# Patient Record
Sex: FEMALE | Race: WHITE | NOT HISPANIC OR LATINO | Employment: STUDENT | URBAN - METROPOLITAN AREA
[De-identification: names, ages, dates, MRNs, and addresses within clinical notes are randomized per-mention and may not be internally consistent; named-entity substitution may affect disease eponyms.]

---

## 2024-05-08 ENCOUNTER — OFFICE VISIT (OUTPATIENT)
Dept: PRIMARY CARE | Facility: CLINIC | Age: 19
End: 2024-05-08
Payer: COMMERCIAL

## 2024-05-08 VITALS
BODY MASS INDEX: 19.63 KG/M2 | SYSTOLIC BLOOD PRESSURE: 96 MMHG | HEART RATE: 73 BPM | HEIGHT: 64 IN | OXYGEN SATURATION: 99 % | DIASTOLIC BLOOD PRESSURE: 62 MMHG | WEIGHT: 115 LBS | RESPIRATION RATE: 18 BRPM

## 2024-05-08 DIAGNOSIS — R63.4 WEIGHT LOSS: Primary | ICD-10-CM

## 2024-05-08 PROCEDURE — 99204 OFFICE O/P NEW MOD 45 MIN: CPT | Performed by: INTERNAL MEDICINE

## 2024-05-08 PROCEDURE — 3008F BODY MASS INDEX DOCD: CPT | Performed by: INTERNAL MEDICINE

## 2024-05-08 ASSESSMENT — PAIN SCALES - GENERAL: PAINLEVEL: 0-NO PAIN

## 2024-05-09 ENCOUNTER — LAB (OUTPATIENT)
Dept: LAB | Facility: LAB | Age: 19
End: 2024-05-09
Payer: COMMERCIAL

## 2024-05-09 DIAGNOSIS — R63.4 WEIGHT LOSS: ICD-10-CM

## 2024-05-09 LAB
25(OH)D3 SERPL-MCNC: 40 NG/ML (ref 30–100)
ALBUMIN SERPL BCP-MCNC: 4.6 G/DL (ref 3.4–5)
ALP SERPL-CCNC: 38 U/L (ref 33–110)
ALT SERPL W P-5'-P-CCNC: 11 U/L (ref 7–45)
ANION GAP SERPL CALC-SCNC: 11 MMOL/L (ref 10–20)
AST SERPL W P-5'-P-CCNC: 15 U/L (ref 9–39)
BILIRUB SERPL-MCNC: 0.6 MG/DL (ref 0–1.2)
BUN SERPL-MCNC: 18 MG/DL (ref 6–23)
CALCIUM SERPL-MCNC: 9.1 MG/DL (ref 8.6–10.3)
CHLORIDE SERPL-SCNC: 107 MMOL/L (ref 98–107)
CHOLEST SERPL-MCNC: 130 MG/DL (ref 0–199)
CHOLESTEROL/HDL RATIO: 2.6
CO2 SERPL-SCNC: 25 MMOL/L (ref 21–32)
CREAT SERPL-MCNC: 0.72 MG/DL (ref 0.5–1.05)
EGFRCR SERPLBLD CKD-EPI 2021: >90 ML/MIN/1.73M*2
ERYTHROCYTE [DISTWIDTH] IN BLOOD BY AUTOMATED COUNT: 13.2 % (ref 11.5–14.5)
EST. AVERAGE GLUCOSE BLD GHB EST-MCNC: 97 MG/DL
GLUCOSE SERPL-MCNC: 80 MG/DL (ref 74–99)
HBA1C MFR BLD: 5 %
HCT VFR BLD AUTO: 38.4 % (ref 36–46)
HDLC SERPL-MCNC: 50.1 MG/DL
HGB BLD-MCNC: 12.3 G/DL (ref 12–16)
HIV 1+2 AB+HIV1 P24 AG SERPL QL IA: NONREACTIVE
LDLC SERPL CALC-MCNC: 64 MG/DL
MCH RBC QN AUTO: 27.8 PG (ref 26–34)
MCHC RBC AUTO-ENTMCNC: 32 G/DL (ref 32–36)
MCV RBC AUTO: 87 FL (ref 80–100)
NON HDL CHOLESTEROL: 80 MG/DL (ref 0–119)
NRBC BLD-RTO: 0 /100 WBCS (ref 0–0)
PLATELET # BLD AUTO: 222 X10*3/UL (ref 150–450)
POTASSIUM SERPL-SCNC: 3.9 MMOL/L (ref 3.5–5.3)
PROT SERPL-MCNC: 6.9 G/DL (ref 6.4–8.2)
RBC # BLD AUTO: 4.43 X10*6/UL (ref 4–5.2)
SODIUM SERPL-SCNC: 139 MMOL/L (ref 136–145)
T3FREE SERPL-MCNC: 3.1 PG/ML (ref 3–4.7)
T4 FREE SERPL-MCNC: 0.77 NG/DL (ref 0.61–1.12)
TRIGL SERPL-MCNC: 79 MG/DL (ref 0–149)
TSH SERPL-ACNC: 1.65 MIU/L (ref 0.44–3.98)
VLDL: 16 MG/DL (ref 0–40)
WBC # BLD AUTO: 3 X10*3/UL (ref 4.4–11.3)

## 2024-05-09 PROCEDURE — 84443 ASSAY THYROID STIM HORMONE: CPT

## 2024-05-09 PROCEDURE — 80053 COMPREHEN METABOLIC PANEL: CPT

## 2024-05-09 PROCEDURE — 85027 COMPLETE CBC AUTOMATED: CPT

## 2024-05-09 PROCEDURE — 36415 COLL VENOUS BLD VENIPUNCTURE: CPT

## 2024-05-09 PROCEDURE — 83036 HEMOGLOBIN GLYCOSYLATED A1C: CPT

## 2024-05-09 PROCEDURE — 84481 FREE ASSAY (FT-3): CPT

## 2024-05-09 PROCEDURE — 82306 VITAMIN D 25 HYDROXY: CPT

## 2024-05-09 PROCEDURE — 84439 ASSAY OF FREE THYROXINE: CPT

## 2024-05-09 PROCEDURE — 80061 LIPID PANEL: CPT

## 2024-05-09 PROCEDURE — 87389 HIV-1 AG W/HIV-1&-2 AB AG IA: CPT

## 2024-05-10 PROBLEM — R63.4 WEIGHT LOSS: Status: ACTIVE | Noted: 2024-05-10

## 2024-05-11 NOTE — PROGRESS NOTES
"Kendal Lane is a 18 y.o. female who presents for New Patient Visit.  Patient presents to [facility].  Patient complains of unintentional weight loss.  She has lost  She is home for the summer from college.  She states that she  is eating more at home than she was at college. Despite this, she is losing weight.  She has occasional alcohol.  She is sexually active.  She denies any symptoms of STI.  She denies the possibility being pregnant.  She has a family history of thyroid disease in her mother.  She is uncertain what the diagnosis was but states she did require surgery.          Objective     BP 96/62 (BP Location: Right arm, Patient Position: Sitting, BP Cuff Size: Adult)   Pulse 73   Resp 18   Ht 1.626 m (5' 4\")   Wt 52.2 kg (115 lb)   LMP 05/08/2024   SpO2 99%   BMI 19.74 kg/m²      Physical Exam  Constitutional:       Appearance: Normal appearance.   HENT:      Head: Normocephalic and atraumatic.      Nose: Nose normal.      Mouth/Throat:      Mouth: Mucous membranes are moist.      Pharynx: Oropharynx is clear.   Eyes:      Extraocular Movements: Extraocular movements intact.      Pupils: Pupils are equal, round, and reactive to light.   Cardiovascular:      Rate and Rhythm: Normal rate and regular rhythm.   Pulmonary:      Effort: No respiratory distress.      Breath sounds: Normal breath sounds. No wheezing, rhonchi or rales.   Abdominal:      General: Bowel sounds are normal. There is no distension.      Palpations: Abdomen is soft.      Tenderness: There is no abdominal tenderness. There is no guarding.   Musculoskeletal:      Right lower leg: No edema.      Left lower leg: No edema.   Skin:     General: Skin is warm and dry.   Neurological:      Mental Status: She is alert and oriented to person, place, and time. Mental status is at baseline.   Psychiatric:         Mood and Affect: Mood normal.         Behavior: Behavior normal.         Assessment/Plan   Problem List Items " Addressed This Visit       Weight loss - Primary    Relevant Orders    CBC (Completed)    Comprehensive Metabolic Panel (Completed)    Hemoglobin A1C (Completed)    Lipid Panel (Completed)    Thyroid Stimulating Hormone (Completed)    Vitamin D 25-Hydroxy,Total (for eval of Vitamin D levels) (Completed)    Thyroxine, Free (Completed)    T3, free (Completed)    HIV 1/2 Antigen/Antibody Screen with Reflex to Confirmation (Completed)    US thyroid            Yordan Jarrett, DO

## 2024-07-09 ENCOUNTER — EVALUATION (OUTPATIENT)
Dept: PHYSICAL THERAPY | Facility: CLINIC | Age: 19
End: 2024-07-09
Payer: COMMERCIAL

## 2024-07-09 DIAGNOSIS — M25.562 ACUTE PAIN OF LEFT KNEE: Primary | ICD-10-CM

## 2024-07-09 PROCEDURE — 97110 THERAPEUTIC EXERCISES: CPT | Mod: GP | Performed by: SPECIALIST/TECHNOLOGIST

## 2024-07-09 PROCEDURE — 97161 PT EVAL LOW COMPLEX 20 MIN: CPT | Mod: GP | Performed by: SPECIALIST/TECHNOLOGIST

## 2024-07-09 NOTE — PROGRESS NOTES
Physical Therapy  Physical Therapy Orthopedic Evaluation    Patient Name: Elvia Lane  MRN: [id number]  Today's Date: 7/9/2024  Time Calculation  Start Time: 0930  Stop Time: 1030  Time Calculation (min): 60 min    Current Problem:  1. Acute pain of left knee            Reason For Visit: Initial Evaluation  Referred by: Self  - CC: L knee pain  - MONTY: pickleball  - DOI: x2 weeks  - PAIN - Location: posterior knee   Worst(past 24 hours): 7   Least(past 24 hours):   0  - PAIN - Alleviating: rest  Aggravating: pickle ball, stairs   - CURRENT MEDICAL MANAGEMENT: L ACLR w/ BPTB 2022  - PLOF: college student, active  - FUNCTIONAL LIMITATIONS: walking running twisting lifting carrying pivoting  - WORK: /college student   - EXERCISE: pickleball, gym     Pain:    7/10  Location: Posterior knee   Description: tightness       Insurance   Payer: [employer]  Visit Number: 1  Approved Visits: 10  Date Range: PCY  Misc: after 10 med review required     Precautions   L knee ACLR TB 2022     Outcome Measures:  Other Measures  Lower Extremity Funtional Score (LEFS): 73     Objective:  Objective   - LOWER EXTREMITY FUNCTIONAL SCALE: 73    - SENSATION: Intact in BLEs.    - POSTURE: WNL  - SWELLING: +  - PALPATION: Anterior Lateral knee, distal B HS      FLEXIBILITY  Teresa:  -   Hamstrings: mild limitation  Quads: -  Gastroc: -    Suzy, Lachmans, and Varus/Valgus: Negative  Apprehension with Med/Lat gliding: -      -MYOTOMES (MMT in sitting):  Hip Flex R/L: 5/5  Hip Abd R/L: 4/4  Hip Ext R/L: 5/5  Knee Ext  R/L:  5/5  Knee Flex R/L: 5/5  Ankle DF (L4) R/L: 5/5    P! W/ long lever bridge, tightness w/ 90/90 kick up and SL RDL    -ROM: (supine)  Hip Flexion R/L: WNL  Hip Extension R/L: WNL  Hip Abd R/L: WNL   ER R/L: WNL   IR R/L: WNL  Knee R and L: Full knee ext bilaterally without pain at end range with Overpressure  Patellar mobility: WNL      -BALANCE:   SLS R: seconds L:    SL Squat: normal depth  Rev Lunge: WNL  SL RDL:  "minor stretch    SL Broad Jump:  WNL  SL Triple Jump: decreased LLE distance noted w/ RFD 88% LSI    Treatments:  There Ex:   90 90 HS Kick Up x30   SL RDL x20   SL HS Slider x20   Knee Ext @ 110 BPM 3x10\"    Manual:     Neuro Re-Ed:     Modalities:     Assessment:     Patient presents with signs and symptoms consistent with L knee p! D/t distal HS strain, resulting in limited participation in pain-free ADLs and inability to perform at their prior level of function. Pt would benefit from physical therapy to address the impairments found & listed previously in the objective section in order to return to safe and pain-free ADLs and prior level of function.    Plan:   Increase HS length, load @ velocity when warranted  Frequency: Every Other Week  Duration: 8 Weeks    EDUCATION: Home exercise program, plan of care, activity modifications, pain management, and injury pathology       Goals:  Patient will be independent with HEP within 2 weeks  Patient will demonstrate pain-free single-leg bridge within 6 weeks to demonstrate decreased irritability of hamstring musculature patient will demonstrate normalized triple hop within 6 weeks to demonstrate normalized rate of force development  Patient will increase LEFS to 80 out of 80 to demonstrate significant progress with therapy.      "

## 2024-07-29 ENCOUNTER — TREATMENT (OUTPATIENT)
Dept: PHYSICAL THERAPY | Facility: CLINIC | Age: 19
End: 2024-07-29
Payer: COMMERCIAL

## 2024-07-29 DIAGNOSIS — M25.562 ACUTE PAIN OF LEFT KNEE: Primary | ICD-10-CM

## 2024-07-29 PROCEDURE — 97110 THERAPEUTIC EXERCISES: CPT | Mod: GP | Performed by: SPECIALIST/TECHNOLOGIST

## 2024-07-29 PROCEDURE — 97140 MANUAL THERAPY 1/> REGIONS: CPT | Mod: GP | Performed by: SPECIALIST/TECHNOLOGIST

## 2024-07-29 NOTE — PROGRESS NOTES
Physical Therapy Treatment    Patient Name: Elvia Lane  MRN: [id number]  Today's Date: 7/29/2024  Time Calculation  Start Time: 0730  Stop Time: 0830  Time Calculation (min): 60 min    Current Problem  1. Acute pain of left knee            Insurance   Payer: [employer]  Visit Number: 2  Approved Visits: 10  Date Range: PCY  Misc: after 10 med review required     Precautions         Subjective   Pt reports feeling good. Denies pain. Compliance w/ HEP.     Pain   0/10      Objective   No TTP  Normalized 90/90 kick up      Treatments:  There Ex:   Bike x6 min  90/90 HS 2x20  Banded Mvmt Prep x2 laps    Dynamic     A1. DL Broad to Vertical 3x5  B1. SL Triple Broad to DL Vertical 3x5  C1. Banded Overspeed Broad 3x3  D1. [place] 3x8  D2. SL RDL 3x8    Manual:     Ext + OP    Neuro Re-Ed:     Modalities:     Assessment:     Session focused on restoration rate of force development as well as hamstring length.  Patient responded very well.  No pain.  At this time patient feels to return to 100%.  Will return to prior level of function and continue to work on rate of force vomit outside of therapy.  Verbalizes plan understanding and agreement with plan of care at this time.  At this time we will monitor HEP and cancel following visits unless patient has flareup    Plan:   Monitor HEP    Goals:         Harshad Shields, PT

## 2024-08-19 ENCOUNTER — APPOINTMENT (OUTPATIENT)
Dept: PHYSICAL THERAPY | Facility: CLINIC | Age: 19
End: 2024-08-19
Payer: COMMERCIAL

## 2024-10-01 ENCOUNTER — OFFICE VISIT (OUTPATIENT)
Dept: PRIMARY CARE | Facility: CLINIC | Age: 19
End: 2024-10-01
Payer: COMMERCIAL

## 2024-10-01 VITALS
HEIGHT: 64 IN | SYSTOLIC BLOOD PRESSURE: 102 MMHG | BODY MASS INDEX: 18.61 KG/M2 | DIASTOLIC BLOOD PRESSURE: 58 MMHG | HEART RATE: 73 BPM | OXYGEN SATURATION: 99 % | WEIGHT: 109 LBS

## 2024-10-01 DIAGNOSIS — G47.00 ACUTE INSOMNIA: ICD-10-CM

## 2024-10-01 DIAGNOSIS — F32.A DEPRESSION, UNSPECIFIED DEPRESSION TYPE: Primary | ICD-10-CM

## 2024-10-01 DIAGNOSIS — R63.4 WEIGHT LOSS, NON-INTENTIONAL: ICD-10-CM

## 2024-10-01 PROCEDURE — 3008F BODY MASS INDEX DOCD: CPT | Performed by: NURSE PRACTITIONER

## 2024-10-01 PROCEDURE — 1036F TOBACCO NON-USER: CPT | Performed by: NURSE PRACTITIONER

## 2024-10-01 PROCEDURE — 99204 OFFICE O/P NEW MOD 45 MIN: CPT | Performed by: NURSE PRACTITIONER

## 2024-10-01 RX ORDER — CALCIUM CARBONATE/VITAMIN D3 500-10/5ML
400 LIQUID (ML) ORAL NIGHTLY
Qty: 30 CAPSULE | Refills: 2 | Status: SHIPPED | OUTPATIENT
Start: 2024-10-01 | End: 2024-12-30

## 2024-10-01 RX ORDER — ESCITALOPRAM OXALATE 5 MG/1
5 TABLET ORAL DAILY
Qty: 30 TABLET | Refills: 2 | Status: SHIPPED | OUTPATIENT
Start: 2024-10-01 | End: 2024-12-30

## 2024-10-01 ASSESSMENT — ENCOUNTER SYMPTOMS
AGITATION: 0
ALLERGIC/IMMUNOLOGIC NEGATIVE: 1
HALLUCINATIONS: 0
MUSCULOSKELETAL NEGATIVE: 1
DYSPHORIC MOOD: 0
DECREASED CONCENTRATION: 0
RESPIRATORY NEGATIVE: 1
LIGHT-HEADEDNESS: 0
CONSTITUTIONAL NEGATIVE: 1
GASTROINTESTINAL NEGATIVE: 1
WEAKNESS: 0
EYES NEGATIVE: 1
CARDIOVASCULAR NEGATIVE: 1
ENDOCRINE NEGATIVE: 1
FACIAL ASYMMETRY: 0
WOUND: 0
POLYPHAGIA: 0
SLEEP DISTURBANCE: 1
CONFUSION: 0
HEMATOLOGIC/LYMPHATIC NEGATIVE: 1
HYPERACTIVE: 0
NEUROLOGICAL NEGATIVE: 1
DEPRESSION: 0
DIZZINESS: 0

## 2024-10-01 NOTE — PATIENT INSTRUCTIONS
2 week FU for medication assessment   Please have therapist fax notes to office    Take magnesium OXIDE 400 mg at bedtime   Try tarte cherry juice at bedtime to help with sleep   Try to eat  4-6 small meals daily   Follow up with nutritional services: [phone]

## 2024-10-01 NOTE — PROGRESS NOTES
Subjective   Patient ID: Elvia Lane is a 18 y.o. female who presents for [facility].    HPI   Elvia is here to [facility] care of note she follows with a therapist in the outpatient community.  And has asked her to be started on Lexapro.  By this provider  She does have increasing depression since starting college over the summer.  Has noted with increased weight loss.  Denies any trouble with eating food.  And sleeping at night.  No thoughts of suicidal ideations.  Would like to refer to nutritional services for further insight.  Does complete a daily journal.    No problems with bowel or bladder.  No headaches or blurred vision.  Up-to-date with dental and eye exams.  No headaches or chest pain.  Discussed verbal remedies for sleeping such as magnesium dietary juice to help with sleep.  Does not take excessive amount of caffeine.  Increased and oral water.    Will try to take in protein rich snacks 4-5 times a day.  Previous labs were completed this year by former PCP reviewed with no issues noted.  Including vitamin D level.  Denies any need for STD testing.  Has a GYN.  Is not on birth control.       PAST MEDICAL HISTORY  07/17/2020: THELMA (acute kidney injury) (HCC)      Comment:  ATN 2/2 ibuprofen use following clavicular fx  No date: Wears glassesPAST SURGICAL HISTORY  No date: ORTHOPEDIC SURGERY HX      Comment:  collar bone with pin, summer 2020    FAMILY HISTORY    Problem Relation Age of Onset    Lipids Father      Heart Paternal Grandmother      Alcohol/Drug Maternal Grandfather           recovering alcoholic    other (lung cancer) Maternal Grandfather      Thyroid Mother           hyper-thyroidectomy    Asthma Mother      other (lung cancer) Other           great maternal grandfather   SOCIAL HISTORY  Social History           Tobacco Use    Smoking status: Never    Smokeless tobacco: Never   Vaping Use    Vaping Use: Never used   Substance Use Topics    Alcohol use: Never    Drug use: Never     Review of  "Systems   Constitutional: Negative.    HENT: Negative.     Eyes: Negative.    Respiratory: Negative.     Cardiovascular: Negative.    Gastrointestinal: Negative.    Endocrine: Negative.  Negative for polyphagia.   Genitourinary: Negative.    Musculoskeletal: Negative.    Skin: Negative.  Negative for pallor, rash and wound.   Allergic/Immunologic: Negative.    Neurological: Negative.  Negative for dizziness, facial asymmetry, weakness and light-headedness.   Hematological: Negative.    Psychiatric/Behavioral:  Positive for sleep disturbance. Negative for agitation, behavioral problems, confusion, decreased concentration, dysphoric mood, hallucinations and self-injury. The patient is not hyperactive.    All other systems reviewed and are negative.      Objective   /58   Pulse 73   Ht 1.626 m (5' 4\")   Wt 49.4 kg (109 lb)   SpO2 99%   BMI 18.71 kg/m²     Physical Exam  Vitals and nursing note reviewed.   Constitutional:       Appearance: Normal appearance. She is normal weight.   HENT:      Head: Normocephalic.      Nose: Nose normal.      Mouth/Throat:      Mouth: Mucous membranes are moist.   Eyes:      Extraocular Movements: Extraocular movements intact.      Conjunctiva/sclera: Conjunctivae normal.      Pupils: Pupils are equal, round, and reactive to light.   Cardiovascular:      Rate and Rhythm: Normal rate and regular rhythm.      Pulses: Normal pulses.      Heart sounds: Normal heart sounds.   Pulmonary:      Effort: Pulmonary effort is normal.   Abdominal:      General: Abdomen is flat. Bowel sounds are normal.      Palpations: Abdomen is soft.   Musculoskeletal:         General: Normal range of motion.      Cervical back: Normal range of motion.   Skin:     General: Skin is warm and dry.   Neurological:      General: No focal deficit present.      Mental Status: She is alert and oriented to person, place, and time.   Psychiatric:         Mood and Affect: Mood normal.         Behavior: Behavior " normal.         Assessment/Plan   1. Depression, unspecified depression type    - escitalopram (Lexapro) 5 mg tablet; Take 1 tablet (5 mg) by mouth once daily.  Dispense: 30 tablet; Refill: 2    2. Weight loss, non-intentional  - Referral to Nutrition Services; Future    3. Acute insomnia  - magnesium oxide 400 mg magnesium capsule; Take 1 capsule (400 mg) by mouth once daily at bedtime.  Dispense: 30 capsule; Refill: 2    Follow-up in 2 weeks for medication assessment.

## 2024-10-08 ENCOUNTER — APPOINTMENT (OUTPATIENT)
Dept: PRIMARY CARE | Facility: CLINIC | Age: 19
End: 2024-10-08
Payer: COMMERCIAL

## 2024-10-18 ENCOUNTER — APPOINTMENT (OUTPATIENT)
Dept: PRIMARY CARE | Facility: CLINIC | Age: 19
End: 2024-10-18
Payer: COMMERCIAL